# Patient Record
Sex: FEMALE | Race: BLACK OR AFRICAN AMERICAN | Employment: FULL TIME | URBAN - METROPOLITAN AREA
[De-identification: names, ages, dates, MRNs, and addresses within clinical notes are randomized per-mention and may not be internally consistent; named-entity substitution may affect disease eponyms.]

---

## 2019-07-08 ENCOUNTER — OFFICE VISIT (OUTPATIENT)
Dept: OBGYN CLINIC | Facility: CLINIC | Age: 49
End: 2019-07-08

## 2019-07-08 VITALS
HEIGHT: 59 IN | BODY MASS INDEX: 35.28 KG/M2 | DIASTOLIC BLOOD PRESSURE: 76 MMHG | SYSTOLIC BLOOD PRESSURE: 118 MMHG | WEIGHT: 175 LBS

## 2019-07-08 DIAGNOSIS — Z01.419 WOMEN'S ANNUAL ROUTINE GYNECOLOGICAL EXAMINATION: Primary | ICD-10-CM

## 2019-07-08 PROBLEM — I10 HTN (HYPERTENSION): Status: ACTIVE | Noted: 2019-07-08

## 2019-07-08 PROBLEM — N95.1 HOT FLASHES DUE TO MENOPAUSE: Status: ACTIVE | Noted: 2019-07-08

## 2019-07-08 PROBLEM — F43.29 ADJUSTMENT DISORDER WITH MIXED EMOTIONAL FEATURES: Status: ACTIVE | Noted: 2019-07-08

## 2019-07-08 PROBLEM — E11.9 DIABETES MELLITUS (HCC): Status: ACTIVE | Noted: 2019-07-08

## 2019-07-08 PROBLEM — L28.2 PRURITIC RASH: Status: ACTIVE | Noted: 2019-07-08

## 2019-07-08 PROBLEM — Z98.84 H/O BARIATRIC SURGERY: Status: ACTIVE | Noted: 2019-07-08

## 2019-07-08 PROBLEM — Z90.710 H/O ABDOMINAL HYSTERECTOMY: Status: ACTIVE | Noted: 2019-07-08

## 2019-07-08 PROCEDURE — 99396 PREV VISIT EST AGE 40-64: CPT | Performed by: OBSTETRICS & GYNECOLOGY

## 2019-07-08 RX ORDER — BLOOD-GLUCOSE METER
KIT MISCELLANEOUS
COMMUNITY
Start: 2015-01-22

## 2019-07-08 RX ORDER — HYDROXYZINE HYDROCHLORIDE 25 MG/1
TABLET, FILM COATED ORAL
COMMUNITY
Start: 2019-04-22 | End: 2019-07-21

## 2019-07-08 RX ORDER — LOSARTAN POTASSIUM 100 MG/1
TABLET ORAL
COMMUNITY
Start: 2019-06-10

## 2019-07-08 RX ORDER — PRAVASTATIN SODIUM 40 MG
TABLET ORAL
COMMUNITY
Start: 2019-06-10

## 2019-07-08 RX ORDER — HYDROCHLOROTHIAZIDE 25 MG/1
TABLET ORAL
COMMUNITY
Start: 2019-06-10

## 2019-07-08 RX ORDER — AMOXICILLIN 250 MG
1 CAPSULE ORAL
COMMUNITY
Start: 2018-10-22 | End: 2019-10-23

## 2019-07-08 RX ORDER — ALBUTEROL SULFATE 90 UG/1
2 AEROSOL, METERED RESPIRATORY (INHALATION)
COMMUNITY
Start: 2014-12-11

## 2019-07-08 RX ORDER — NAPROXEN 500 MG/1
500 TABLET ORAL
COMMUNITY
Start: 2018-11-03

## 2019-07-08 NOTE — PROGRESS NOTES
Camryn Caro is here for a checkup. I have not seen her for many years. She is a 53-year-old -American female  0 para 0. Patient had supracervical hysterectomy and bilateral salpingo-oophorectomy 2013.   Patient is currently on estroge Current Outpatient Medications:  triamcinolone acetonide 0.1 % External Ointment Apply topically.  Disp:  Rfl:    losartan 100 MG Oral Tab 100 MG = 1 TAB, PO, Daily, # 90 TAB, 3 Refill(s), 06/10/19 9:52:31 CDT, Pharmacy: [facility] Disp: Food insecurity:        Worry: Not on file        Inability: Not on file      Transportation needs:        Medical: Not on file        Non-medical: Not on file    Tobacco Use      Smoking status: Never Smoker      Smokeless tobacco: Never Used    Substa Uterus: Absent,                     Cervix: Normal in appearance                     Adnexa: non tender, no masses, normal size          Rectal: Not performed  EXTREMITIES:  non tender without edema    1.  Women's annual routine gynecological examinati